# Patient Record
Sex: FEMALE
[De-identification: names, ages, dates, MRNs, and addresses within clinical notes are randomized per-mention and may not be internally consistent; named-entity substitution may affect disease eponyms.]

---

## 2024-05-22 ENCOUNTER — APPOINTMENT (OUTPATIENT)
Dept: UROLOGY | Facility: CLINIC | Age: 64
End: 2024-05-22
Payer: MEDICAID

## 2024-05-22 VITALS
HEART RATE: 65 BPM | DIASTOLIC BLOOD PRESSURE: 77 MMHG | BODY MASS INDEX: 25.69 KG/M2 | WEIGHT: 145 LBS | TEMPERATURE: 97.5 F | SYSTOLIC BLOOD PRESSURE: 130 MMHG | HEIGHT: 63 IN | OXYGEN SATURATION: 99 %

## 2024-05-22 DIAGNOSIS — Z87.898 PERSONAL HISTORY OF OTHER SPECIFIED CONDITIONS: ICD-10-CM

## 2024-05-22 DIAGNOSIS — R10.2 PELVIC AND PERINEAL PAIN: ICD-10-CM

## 2024-05-22 DIAGNOSIS — N81.9 FEMALE GENITAL PROLAPSE, UNSPECIFIED: ICD-10-CM

## 2024-05-22 PROBLEM — Z00.00 ENCOUNTER FOR PREVENTIVE HEALTH EXAMINATION: Status: ACTIVE | Noted: 2024-05-22

## 2024-05-22 PROCEDURE — 99204 OFFICE O/P NEW MOD 45 MIN: CPT

## 2024-05-22 RX ORDER — ESTRADIOL 0.1 MG/G
0.1 CREAM VAGINAL
Qty: 1 | Refills: 0 | Status: ACTIVE | COMMUNITY
Start: 2024-05-22 | End: 1900-01-01

## 2024-05-22 RX ORDER — PANTOPRAZOLE SODIUM 40 MG/1
40 GRANULE, DELAYED RELEASE ORAL
Refills: 0 | Status: ACTIVE | COMMUNITY

## 2024-05-23 ENCOUNTER — TRANSCRIPTION ENCOUNTER (OUTPATIENT)
Age: 64
End: 2024-05-23

## 2024-05-27 NOTE — HISTORY OF PRESENT ILLNESS
[FreeTextEntry1] : 64-year-old  female s/p Hysterectomy and prolapse repair by Dr. Hussein ([facility]) in . She now reports prolapse sensation, incontinence, hesitancy, intermittency and difficulty emptying. She sometimes has to splint to void and states urine sprays in different directions. Incontinence worse with ambulation. She uses 3-5 pads per day which are sometimes fully saturated. She sometimes has blood and yellow discharge from vagina. She also has mild discomfort with intercourse. She was seen by Dr. Hussein's NP 2 months ago for same and was told Dr. Hussein will not be able to do surgery because her insurance is not accepted. She was sent for an MRI which showed a moderate cystocele.   MRI Urogram 24 Impression: Normal CT Urogram. No hydronephrosis, evidence of renal mass or upper collecting system lesion. Moderate Cystocele at rest.   [facility] Operative Report-Dr. Hussein-12 Pre-Op Diagnosis-Uterovaginal prolapse and Stress Urinary Incontinence Operation: Vaginal hysterectomy, Frey culdoplasty, sacrospinous ligament fixation, cystocele repair, placement of transvaginal obturator tape, and cystoscopy.  PVR-261ml  PMH-GERD PSH-Salpingostomy, salpingectomy, , hysterectomy FamHx-Prostate CA(brother), Colon CA(mother) SocHx-denies smoking, drugs or alcohol use Meds/Vitamins-Protonix Allergies-contrast dye(swelling)

## 2024-05-27 NOTE — ASSESSMENT
[FreeTextEntry1] :   Impression/plan: 64-year-old female with pelvic organ prolapse, incomplete emptying. Stage 3 anterior and apical prolapse, stage 1 rectocele.   -Options discussed including pessary and reconstruction. Chances of recurrence and surgical risks reviewed. Patient has chosen to move forward with a pessary. -Estrace for eroded tissue -F/u for pessary fitting after using estrace x 4 weeks  I, Dr. Yeyo Helms, personally performed the evaluation and management (E/M) services for this new patient.  That E/M includes conducting the clinically appropriate initial history &/or exam, assessing all conditions, and establishing the plan of care.  Today, my SUNNY Joanie, was here to observe &/or participate in the visit & follow plan of care established by me.

## 2024-05-27 NOTE — PHYSICAL EXAM
[General Appearance - Well Developed] : well developed [General Appearance - Well Nourished] : well nourished [Normal Appearance] : normal appearance [Well Groomed] : well groomed [General Appearance - In No Acute Distress] : no acute distress [] : no respiratory distress [Exaggerated Use Of Accessory Muscles For Inspiration] : no accessory muscle use [Abdomen Soft] : soft [Abdomen Tenderness] : non-tender [External Female Genitalia] : normal external genitalia [de-identified] : Stage 3 anterior and apical prolapse, stage 1 posterior prolapse, area of vaginal tissue erosion, no mesh palpated on exam

## 2024-06-21 ENCOUNTER — APPOINTMENT (OUTPATIENT)
Dept: UROLOGY | Facility: CLINIC | Age: 64
End: 2024-06-21
Payer: MEDICAID

## 2024-06-21 VITALS
TEMPERATURE: 97.3 F | DIASTOLIC BLOOD PRESSURE: 82 MMHG | OXYGEN SATURATION: 95 % | SYSTOLIC BLOOD PRESSURE: 123 MMHG | HEART RATE: 73 BPM

## 2024-06-21 DIAGNOSIS — N81.10 CYSTOCELE, UNSPECIFIED: ICD-10-CM

## 2024-06-21 PROCEDURE — 99213 OFFICE O/P EST LOW 20 MIN: CPT

## 2024-06-24 PROBLEM — N81.10 VAGINAL PROLAPSE: Status: ACTIVE | Noted: 2024-05-22

## 2024-06-24 NOTE — HISTORY OF PRESENT ILLNESS
[FreeTextEntry1] : 64-year-old  female s/p Hysterectomy and prolapse repair by Dr. Hussein ([facility]) in . She now reports prolapse sensation, incontinence, hesitancy, intermittency and difficulty emptying. She sometimes has to splint to void and states urine sprays in different directions. Incontinence worse with ambulation. She uses 3-5 pads per day which are sometimes fully saturated. She sometimes has blood and yellow discharge from vagina. She also has mild discomfort with intercourse. She was seen by Dr. Hussein's NP 2 months ago for same and was told Dr. Hussein will not be able to do surgery because her insurance is not accepted. She was sent for an MRI which showed a moderate cystocele.  MRI Urogram 24 Impression: Normal CT Urogram. No hydronephrosis, evidence of renal mass or upper collecting system lesion. Moderate Cystocele at rest.  [facility] Operative Report-Dr. Hussein-12 Pre-Op Diagnosis-Uterovaginal prolapse and Stress Urinary Incontinence Operation: Vaginal hysterectomy, Frey culdoplasty, sacrospinous ligament fixation, cystocele repair, placement of transvaginal obturator tape, and cystoscopy.  PVR-261ml  PMH-GERD PSH-Salpingostomy, salpingectomy, , hysterectomy FamHx-Prostate CA(brother), Colon CA(mother) SocHx-denies smoking, drugs or alcohol use Meds/Vitamins-Protonix Allergies-contrast dye(swelling)   24  64-year-old  female s/p vaginal hysterectomy and cystocele repair, now with stage 3 anterior and apical prolapse, stage 1 rectocele. Treated with estrace x 4 weeks due to vaginal tissue erosion. She is here today for pessary fitting. PE: Granulation tissue present to eroded area. No active bleeding or purulent drainage.

## 2024-06-24 NOTE — ASSESSMENT
[FreeTextEntry1] :   Impression/plan: 64-year-old female with pelvic organ prolapse-Stage 3 anterior and apical prolapse, stage 1 rectocele.  -Pessary fitting deferred due to eroded tissue -Patient advised to continue estrace for another 6-8 weeks to promote healing -Patient is interested surgical options. Informed her that an abdominal repair with mesh is recommended to decrease the risk of recurrence. She verbalized understanding.  -F/u in 6-8 weeks for PE. Will further discuss surgery at that time.   I, Dr. Yeyo Helms, personally performed the evaluation and management (E/M) services for this established patient who presents today with (a) new problem(s)/exacerbation of (an) existing condition(s).  That E/M includes conducting the clinically appropriate interval history &/or exam, assessing all new/exacerbated conditions, and establishing a new plan of care.  Today, my SUNNY Joanie, was here to observe &/or participate in the visit & follow plan of care established by me.

## 2024-06-24 NOTE — PHYSICAL EXAM
[General Appearance - Well Developed] : well developed [General Appearance - Well Nourished] : well nourished [Normal Appearance] : normal appearance [Well Groomed] : well groomed [General Appearance - In No Acute Distress] : no acute distress [] : no respiratory distress [Exaggerated Use Of Accessory Muscles For Inspiration] : no accessory muscle use [Normal Station and Gait] : the gait and station were normal for the patient's age [Skin Color & Pigmentation] : normal skin color and pigmentation [No Focal Deficits] : no focal deficits [Oriented To Time, Place, And Person] : oriented to person, place, and time [Affect] : the affect was normal [Mood] : the mood was normal [Not Anxious] : not anxious [de-identified] : Granulation tissue present to eroded area. No active bleeding or purulent drainage.

## 2024-07-03 ENCOUNTER — TRANSCRIPTION ENCOUNTER (OUTPATIENT)
Age: 64
End: 2024-07-03

## 2024-07-18 ENCOUNTER — NON-APPOINTMENT (OUTPATIENT)
Age: 64
End: 2024-07-18

## 2024-07-18 ENCOUNTER — TRANSCRIPTION ENCOUNTER (OUTPATIENT)
Age: 64
End: 2024-07-18

## 2024-07-30 ENCOUNTER — APPOINTMENT (OUTPATIENT)
Dept: UROLOGY | Facility: CLINIC | Age: 64
End: 2024-07-30
Payer: MEDICAID

## 2024-07-30 PROCEDURE — 99214 OFFICE O/P EST MOD 30 MIN: CPT

## 2024-08-04 NOTE — HISTORY OF PRESENT ILLNESS
[FreeTextEntry1] : 64-year-old  female s/p Hysterectomy and prolapse repair by Dr. Hussein ([facility]) in . She now reports prolapse sensation, incontinence, hesitancy, intermittency and difficulty emptying. She sometimes has to splint to void and states urine sprays in different directions. Incontinence worse with ambulation. She uses 3-5 pads per day which are sometimes fully saturated. She sometimes has blood and yellow discharge from vagina. She also has mild discomfort with intercourse. She was seen by Dr. Hussein's NP 2 months ago for same and was told Dr. Hussein will not be able to do surgery because her insurance is not accepted. She was sent for an MRI which showed a moderate cystocele.  MRI Urogram 24 Impression: Normal CT Urogram. No hydronephrosis, evidence of renal mass or upper collecting system lesion. Moderate Cystocele at rest.  [facility] Operative Report-Dr. Hussein-12 Pre-Op Diagnosis-Uterovaginal prolapse and Stress Urinary Incontinence Operation: Vaginal hysterectomy, Frey culdoplasty, sacrospinous ligament fixation, cystocele repair, placement of transvaginal obturator tape, and cystoscopy.  PVR-261ml  PMH-GERD PSH-Salpingostomy, salpingectomy, , hysterectomy FamHx-Prostate CA(brother), Colon CA(mother) SocHx-denies smoking, drugs or alcohol use Meds/Vitamins-Protonix Allergies-contrast dye(swelling)   24  64-year-old  female s/p vaginal hysterectomy and cystocele repair, now with stage 3 anterior and apical prolapse, stage 1 rectocele. Treated with estrace x 4 weeks due to vaginal tissue erosion. She is here today for pessary fitting. PE: Granulation tissue present to eroded area. No active bleeding or purulent drainage.  24  64-year-old  female s/p vaginal hysterectomy and cystocele repair, now with stage 3 anterior and apical prolapse, stage 1 rectocele. Treated with estrace x 4 weeks due to vaginal tissue erosion. On exam today area of erosion improved in quality, but still present, not a pessary candidate.   We reviewed surgical options.  I explained concern about mesh with the vaginal erosion, would recommend a native tissue repair.   Plan:  Options for management of pelvic organ prolapse discussed at length with the patient today. Conservative options including total for physical therapy and pessary were discussed with the patient. Surgical options discussed with the patient including both transabdominal and transvaginal routes with or without mesh augmentation were discussed with the patient. Both laparoscopic robotic and open options discussed with the transabdominal route. FDA advisory for the use of mesh and prolapse surgery were discussed with the patient at length. RBAPC of our procedure was discussed at length appeared. Risks including, but not limited to, injury to the bladder, injury to the urethra, injury to bowel, injury to nerves and vascular structures, bleeding, infections, persistent or recurrent prolapse, pelvic/abdominal/vaginal pain, dyspareunia, fistula formation, ureteral injury, and dysfunctional voiding were discussed with the patient at length. After all the above was discussed and all questions answered she decided to move forward with transvaginal SSF/Anterior-posterior repair.  2. UDS r/o occult JOSE.

## 2024-08-04 NOTE — HISTORY OF PRESENT ILLNESS
[FreeTextEntry1] : 64-year-old  female s/p Hysterectomy and prolapse repair by Dr. Hussein ([facility]) in . She now reports prolapse sensation, incontinence, hesitancy, intermittency and difficulty emptying. She sometimes has to splint to void and states urine sprays in different directions. Incontinence worse with ambulation. She uses 3-5 pads per day which are sometimes fully saturated. She sometimes has blood and yellow discharge from vagina. She also has mild discomfort with intercourse. She was seen by Dr. Hussein's NP 2 months ago for same and was told Dr. Hussein will not be able to do surgery because her insurance is not accepted. She was sent for an MRI which showed a moderate cystocele.  MRI Urogram 24 Impression: Normal CT Urogram. No hydronephrosis, evidence of renal mass or upper collecting system lesion. Moderate Cystocele at rest.  [facility] Operative Report-Dr. Hussein-12 Pre-Op Diagnosis-Uterovaginal prolapse and Stress Urinary Incontinence Operation: Vaginal hysterectomy, Frey culdoplasty, sacrospinous ligament fixation, cystocele repair, placement of transvaginal obturator tape, and cystoscopy.  PVR-261ml  PMH-GERD PSH-Salpingostomy, salpingectomy, , hysterectomy FamHx-Prostate CA(brother), Colon CA(mother) SocHx-denies smoking, drugs or alcohol use Meds/Vitamins-Protonix Allergies-contrast dye(swelling)   24  64-year-old  female s/p vaginal hysterectomy and cystocele repair, now with stage 3 anterior and apical prolapse, stage 1 rectocele. Treated with estrace x 4 weeks due to vaginal tissue erosion. She is here today for pessary fitting. PE: Granulation tissue present to eroded area. No active bleeding or purulent drainage.  24  64-year-old  female s/p vaginal hysterectomy and cystocele repair, now with stage 3 anterior and apical prolapse, stage 1 rectocele. Treated with estrace x 4 weeks due to vaginal tissue erosion. On exam today area of erosion improved in quality, but still present, not a pessary candidate.   We reviewed surgical options.  I explained concern about mesh with the vaginal erosion, would recommend a native tissue repair.   Plan:  Options for management of pelvic organ prolapse discussed at length with the patient today. Conservative options including total for physical therapy and pessary were discussed with the patient. Surgical options discussed with the patient including both transabdominal and transvaginal routes with or without mesh augmentation were discussed with the patient. Both laparoscopic robotic and open options discussed with the transabdominal route. FDA advisory for the use of mesh and prolapse surgery were discussed with the patient at length. RBAPC of our procedure was discussed at length appeared. Risks including, but not limited to, injury to the bladder, injury to the urethra, injury to bowel, injury to nerves and vascular structures, bleeding, infections, persistent or recurrent prolapse, pelvic/abdominal/vaginal pain, dyspareunia, fistula formation, ureteral injury, and dysfunctional voiding were discussed with the patient at length. After all the above was discussed and all questions answered she decided to move forward with transvaginal SSF/Anterior-posterior repair.  2. UDS r/o occult JOSE.

## 2024-08-05 ENCOUNTER — APPOINTMENT (OUTPATIENT)
Dept: UROLOGY | Facility: CLINIC | Age: 64
End: 2024-08-05

## 2024-08-05 PROCEDURE — 51728 CYSTOMETROGRAM W/VP: CPT

## 2024-08-05 PROCEDURE — 99215 OFFICE O/P EST HI 40 MIN: CPT | Mod: 25

## 2024-08-05 PROCEDURE — 51797 INTRAABDOMINAL PRESSURE TEST: CPT

## 2024-08-05 PROCEDURE — 51784 ANAL/URINARY MUSCLE STUDY: CPT

## 2024-08-05 PROCEDURE — 51741 ELECTRO-UROFLOWMETRY FIRST: CPT

## 2024-08-10 NOTE — HISTORY OF PRESENT ILLNESS
[FreeTextEntry1] : 64-year-old  female s/p Hysterectomy and prolapse repair by Dr. Hussein ([facility]) in . She now reports prolapse sensation, incontinence, hesitancy, intermittency and difficulty emptying. She sometimes has to splint to void and states urine sprays in different directions. Incontinence worse with ambulation. She uses 3-5 pads per day which are sometimes fully saturated. She sometimes has blood and yellow discharge from vagina. She also has mild discomfort with intercourse. She was seen by Dr. Hussein's NP 2 months ago for same and was told Dr. Hussein will not be able to do surgery because her insurance is not accepted. She was sent for an MRI which showed a moderate cystocele.  MRI Urogram 24 Impression: Normal CT Urogram. No hydronephrosis, evidence of renal mass or upper collecting system lesion. Moderate Cystocele at rest.  [facility] Operative Report-Dr. Hussein-12 Pre-Op Diagnosis-Uterovaginal prolapse and Stress Urinary Incontinence Operation: Vaginal hysterectomy, Frey culdoplasty, sacrospinous ligament fixation, cystocele repair, placement of transvaginal obturator tape, and cystoscopy.  PVR-261ml  PMH-GERD PSH-Salpingostomy, salpingectomy, , hysterectomy FamHx-Prostate CA(brother), Colon CA(mother) SocHx-denies smoking, drugs or alcohol use Meds/Vitamins-Protonix Allergies-contrast dye(swelling)   24  64-year-old  female s/p vaginal hysterectomy and cystocele repair, now with stage 3 anterior and apical prolapse, stage 1 rectocele. Treated with estrace x 4 weeks due to vaginal tissue erosion. She is here today for pessary fitting. PE: Granulation tissue present to eroded area. No active bleeding or purulent drainage.  24  64-year-old  female s/p vaginal hysterectomy and cystocele repair, now with stage 3 anterior and apical prolapse, stage 1 rectocele. Treated with estrace x 4 weeks due to vaginal tissue erosion. On exam today area of erosion improved in quality, but still present, not a pessary candidate.  We reviewed surgical options.  I explained concern about mesh with the vaginal erosion, would recommend a native tissue repair.  Plan: Options for management of pelvic organ prolapse discussed at length with the patient today. Conservative options including total for physical therapy and pessary were discussed with the patient. Surgical options discussed with the patient including both transabdominal and transvaginal routes with or without mesh augmentation were discussed with the patient. Both laparoscopic robotic and open options discussed with the transabdominal route. FDA advisory for the use of mesh and prolapse surgery were discussed with the patient at length. RBAPC of our procedure was discussed at length appeared. Risks including, but not limited to, injury to the bladder, injury to the urethra, injury to bowel, injury to nerves and vascular structures, bleeding, infections, persistent or recurrent prolapse, pelvic/abdominal/vaginal pain, dyspareunia, fistula formation, ureteral injury, and dysfunctional voiding were discussed with the patient at length. After all the above was discussed and all questions answered she decided to move forward with transvaginal SSF/Anterior-posterior repair. 2. UDS r/o occult JOSE.  24  64-year-old  female s/p vaginal hysterectomy and cystocele repair, now with stage 3 anterior and apical prolapse, stage 1 rectocele. Treated with estrace x 4 weeks due to vaginal tissue erosion. On exam today area of erosion improved in quality, but still present, not a pessary candidate. The patient is here today to have urodynamics performed, review results and discuss treatment options. Denies significant change in med/surg history since last office visit.   UDS -  Filling/Storage Phase: First sensation 20 mL, First desire 238 mL, Normal desire 488 mL and Cystometric capacity 544 mL. Involuntary contractions were not present . Pt did not demonstrate stress urinary incontinence. Pt did not demonstrate urge urinary incontinence. Compliance: normal. EMG Activity: normal.    Voiding Phase: Qmax 6.6 mL/s , Pdet at Qmax 38 cm/H20, Qmax at Pdet 1.4 mL/s, Pavg 20.2 cm/H20, Qavg 3.3 mL/s, voiding time 1:31 mm:sec, voided volume 154 mL and post void residual 390 mL. EMG activity was synergic.   Urodynamic Interpretation :   Normal bladder sensation. Normal bladder capacity. Patient experiencing no detrusor instability. The uroflow rate is decreased. The uroflow pattern is staccato. The detrusor contractility is normal. The intravesical voiding pressure is normal. The patient has incomplete bladder emptying, a post void residual of 390 cc. The spincter activity  is normal synergic.   Additional Procedure Related Findings/Comments: Catheterized patient post-UDS.  Plan: UDS reviewed with the patoent - no occult JOSE with pessary in place.  1. Options for management of pelvic organ prolapse discussed at length with the patient today. Conservative options including total for physical therapy and pessary were discussed with the patient. Surgical options discussed with the patient including both transabdominal and transvaginal routes with or without mesh augmentation were discussed with the patient. Both laparoscopic robotic and open options discussed with the transabdominal route. FDA advisory for the use of mesh and prolapse surgery were discussed with the patient at length. RBAPC of our procedure was discussed at length appeared. Risks including, but not limited to, injury to the bladder, injury to the urethra, injury to bowel, injury to nerves and vascular structures, bleeding, infections, persistent or recurrent prolapse, pelvic/abdominal/vaginal pain, dyspareunia, fistula formation, ureteral injury, and dysfunctional voiding were discussed with the patient at length. After all the above was discussed and all questions answered she decided to move forward with transvaginal sacrospinous fixation/a/p repair given risk of mesh with erosion in place. Sling not indicated given no evidence of occult incontinence.

## 2024-08-10 NOTE — HISTORY OF PRESENT ILLNESS
[FreeTextEntry1] : 64-year-old  female s/p Hysterectomy and prolapse repair by Dr. Hussein ([facility]) in . She now reports prolapse sensation, incontinence, hesitancy, intermittency and difficulty emptying. She sometimes has to splint to void and states urine sprays in different directions. Incontinence worse with ambulation. She uses 3-5 pads per day which are sometimes fully saturated. She sometimes has blood and yellow discharge from vagina. She also has mild discomfort with intercourse. She was seen by Dr. Hussein's NP 2 months ago for same and was told Dr. Hussein will not be able to do surgery because her insurance is not accepted. She was sent for an MRI which showed a moderate cystocele.  MRI Urogram 24 Impression: Normal CT Urogram. No hydronephrosis, evidence of renal mass or upper collecting system lesion. Moderate Cystocele at rest.  [facility] Operative Report-Dr. Hussein-12 Pre-Op Diagnosis-Uterovaginal prolapse and Stress Urinary Incontinence Operation: Vaginal hysterectomy, Frey culdoplasty, sacrospinous ligament fixation, cystocele repair, placement of transvaginal obturator tape, and cystoscopy.  PVR-261ml  PMH-GERD PSH-Salpingostomy, salpingectomy, , hysterectomy FamHx-Prostate CA(brother), Colon CA(mother) SocHx-denies smoking, drugs or alcohol use Meds/Vitamins-Protonix Allergies-contrast dye(swelling)   24  64-year-old  female s/p vaginal hysterectomy and cystocele repair, now with stage 3 anterior and apical prolapse, stage 1 rectocele. Treated with estrace x 4 weeks due to vaginal tissue erosion. She is here today for pessary fitting. PE: Granulation tissue present to eroded area. No active bleeding or purulent drainage.  24  64-year-old  female s/p vaginal hysterectomy and cystocele repair, now with stage 3 anterior and apical prolapse, stage 1 rectocele. Treated with estrace x 4 weeks due to vaginal tissue erosion. On exam today area of erosion improved in quality, but still present, not a pessary candidate.  We reviewed surgical options.  I explained concern about mesh with the vaginal erosion, would recommend a native tissue repair.  Plan: Options for management of pelvic organ prolapse discussed at length with the patient today. Conservative options including total for physical therapy and pessary were discussed with the patient. Surgical options discussed with the patient including both transabdominal and transvaginal routes with or without mesh augmentation were discussed with the patient. Both laparoscopic robotic and open options discussed with the transabdominal route. FDA advisory for the use of mesh and prolapse surgery were discussed with the patient at length. RBAPC of our procedure was discussed at length appeared. Risks including, but not limited to, injury to the bladder, injury to the urethra, injury to bowel, injury to nerves and vascular structures, bleeding, infections, persistent or recurrent prolapse, pelvic/abdominal/vaginal pain, dyspareunia, fistula formation, ureteral injury, and dysfunctional voiding were discussed with the patient at length. After all the above was discussed and all questions answered she decided to move forward with transvaginal SSF/Anterior-posterior repair. 2. UDS r/o occult JOSE.  24  64-year-old  female s/p vaginal hysterectomy and cystocele repair, now with stage 3 anterior and apical prolapse, stage 1 rectocele. Treated with estrace x 4 weeks due to vaginal tissue erosion. On exam today area of erosion improved in quality, but still present, not a pessary candidate. The patient is here today to have urodynamics performed, review results and discuss treatment options. Denies significant change in med/surg history since last office visit.   UDS -  Filling/Storage Phase: First sensation 20 mL, First desire 238 mL, Normal desire 488 mL and Cystometric capacity 544 mL. Involuntary contractions were not present . Pt did not demonstrate stress urinary incontinence. Pt did not demonstrate urge urinary incontinence. Compliance: normal. EMG Activity: normal.    Voiding Phase: Qmax 6.6 mL/s , Pdet at Qmax 38 cm/H20, Qmax at Pdet 1.4 mL/s, Pavg 20.2 cm/H20, Qavg 3.3 mL/s, voiding time 1:31 mm:sec, voided volume 154 mL and post void residual 390 mL. EMG activity was synergic.   Urodynamic Interpretation :   Normal bladder sensation. Normal bladder capacity. Patient experiencing no detrusor instability. The uroflow rate is decreased. The uroflow pattern is staccato. The detrusor contractility is normal. The intravesical voiding pressure is normal. The patient has incomplete bladder emptying, a post void residual of 390 cc. The spincter activity  is normal synergic.   Additional Procedure Related Findings/Comments: Catheterized patient post-UDS.  Plan: UDS reviewed with the patoent - no occult JOSE with pessary in place.  1. Options for management of pelvic organ prolapse discussed at length with the patient today. Conservative options including total for physical therapy and pessary were discussed with the patient. Surgical options discussed with the patient including both transabdominal and transvaginal routes with or without mesh augmentation were discussed with the patient. Both laparoscopic robotic and open options discussed with the transabdominal route. FDA advisory for the use of mesh and prolapse surgery were discussed with the patient at length. RBAPC of our procedure was discussed at length appeared. Risks including, but not limited to, injury to the bladder, injury to the urethra, injury to bowel, injury to nerves and vascular structures, bleeding, infections, persistent or recurrent prolapse, pelvic/abdominal/vaginal pain, dyspareunia, fistula formation, ureteral injury, and dysfunctional voiding were discussed with the patient at length. After all the above was discussed and all questions answered she decided to move forward with transvaginal sacrospinous fixation/a/p repair given risk of mesh with erosion in place. Sling not indicated given no evidence of occult incontinence.

## 2024-09-20 NOTE — ASU PATIENT PROFILE, ADULT - NSICDXPASTSURGICALHX_GEN_ALL_CORE_FT
PAST SURGICAL HISTORY:  No significant past surgical history      PAST SURGICAL HISTORY:  H/O: hysterectomy     History of bladder surgery lift    History of  section x1    History of ectopic pregnancy

## 2024-09-20 NOTE — ASU PATIENT PROFILE, ADULT - FALL HARM RISK - RISK INTERVENTIONS

## 2024-09-20 NOTE — ASU PATIENT PROFILE, ADULT - NS PREOP UNDERSTANDS INFO
Spoke to patient to be NPO/No solid  foods  after 2200 pm , allow to drink water or apple juice till  12-2 am, dres socmofrtale, no lotions, no jewelry, no nail polish, bring  Id photo  and insurance cards , escort arranged,  address and telephone given/yes

## 2024-09-20 NOTE — ASU PATIENT PROFILE, ADULT - NSICDXPASTMEDICALHX_GEN_ALL_CORE_FT
PAST MEDICAL HISTORY:  Prolapse of female bladder, acquired      PAST MEDICAL HISTORY:  GERD (gastroesophageal reflux disease)     History of UTI at present currently taking amoxicillin Rx given by PCP did not take today    Prolapse of female bladder, acquired

## 2024-09-20 NOTE — ASU PATIENT PROFILE, ADULT - FALL HARM RISK - FALLEN IN PAST
No stepped into pot hole and twisted left ankle never Went for any medical follow up  Per pt Ok After one week/Accidental fall

## 2024-09-22 ENCOUNTER — TRANSCRIPTION ENCOUNTER (OUTPATIENT)
Age: 64
End: 2024-09-22

## 2024-09-23 ENCOUNTER — OUTPATIENT (OUTPATIENT)
Dept: OUTPATIENT SERVICES | Facility: HOSPITAL | Age: 64
LOS: 1 days | Discharge: HOME | End: 2024-09-23
Payer: MEDICAID

## 2024-09-23 ENCOUNTER — TRANSCRIPTION ENCOUNTER (OUTPATIENT)
Age: 64
End: 2024-09-23

## 2024-09-23 ENCOUNTER — APPOINTMENT (OUTPATIENT)
Dept: UROLOGY | Facility: AMBULATORY SURGERY CENTER | Age: 64
End: 2024-09-23

## 2024-09-23 VITALS
DIASTOLIC BLOOD PRESSURE: 65 MMHG | HEART RATE: 69 BPM | SYSTOLIC BLOOD PRESSURE: 115 MMHG | RESPIRATION RATE: 18 BRPM | OXYGEN SATURATION: 96 %

## 2024-09-23 VITALS
TEMPERATURE: 98 F | OXYGEN SATURATION: 97 % | SYSTOLIC BLOOD PRESSURE: 122 MMHG | HEART RATE: 58 BPM | WEIGHT: 194.23 LBS | DIASTOLIC BLOOD PRESSURE: 81 MMHG | HEIGHT: 64 IN | RESPIRATION RATE: 16 BRPM

## 2024-09-23 DIAGNOSIS — Z98.890 OTHER SPECIFIED POSTPROCEDURAL STATES: Chronic | ICD-10-CM

## 2024-09-23 DIAGNOSIS — Z87.59 PERSONAL HISTORY OF OTHER COMPLICATIONS OF PREGNANCY, CHILDBIRTH AND THE PUERPERIUM: Chronic | ICD-10-CM

## 2024-09-23 DIAGNOSIS — Z98.891 HISTORY OF UTERINE SCAR FROM PREVIOUS SURGERY: Chronic | ICD-10-CM

## 2024-09-23 DIAGNOSIS — Z90.710 ACQUIRED ABSENCE OF BOTH CERVIX AND UTERUS: Chronic | ICD-10-CM

## 2024-09-23 PROCEDURE — 57240 ANTERIOR COLPORRHAPHY: CPT

## 2024-09-23 PROCEDURE — 57283 COLPOPEXY INTRAPERITONEAL: CPT

## 2024-09-23 DEVICE — SLING FEMALE DESARA: Type: IMPLANTABLE DEVICE | Status: FUNCTIONAL

## 2024-09-23 RX ORDER — OXYCODONE HYDROCHLORIDE 5 MG/1
5 TABLET ORAL ONCE
Refills: 0 | Status: DISCONTINUED | OUTPATIENT
Start: 2024-09-23 | End: 2024-09-23

## 2024-09-23 RX ORDER — PANTOPRAZOLE SODIUM 40 MG
1 TABLET, DELAYED RELEASE (ENTERIC COATED) ORAL
Refills: 0 | DISCHARGE

## 2024-09-23 RX ORDER — DOCUSATE CALCIUM 240 MG/1
1 CAPSULE ORAL
Qty: 20 | Refills: 0
Start: 2024-09-23 | End: 2024-10-02

## 2024-09-23 RX ORDER — ACETAMINOPHEN 325 MG/1
1000 TABLET ORAL ONCE
Refills: 0 | Status: COMPLETED | OUTPATIENT
Start: 2024-09-23 | End: 2024-09-23

## 2024-09-23 RX ORDER — ACETAMINOPHEN AND CODEINE PHOSPHATE 300; 60 MG/1; MG/1
1 TABLET ORAL
Qty: 5 | Refills: 0
Start: 2024-09-23

## 2024-09-23 RX ORDER — APREPITANT 40 MG/1
40 CAPSULE ORAL ONCE
Refills: 0 | Status: COMPLETED | OUTPATIENT
Start: 2024-09-23 | End: 2024-09-23

## 2024-09-23 RX ORDER — ACETAMINOPHEN 325 MG/1
1000 TABLET ORAL ONCE
Refills: 0 | Status: DISCONTINUED | OUTPATIENT
Start: 2024-09-23 | End: 2024-09-23

## 2024-09-23 RX ORDER — AMOXICILLIN 500 MG
1 CAPSULE ORAL
Refills: 0 | DISCHARGE

## 2024-09-23 RX ADMIN — OXYCODONE HYDROCHLORIDE 5 MILLIGRAM(S): 5 TABLET ORAL at 13:16

## 2024-09-23 RX ADMIN — APREPITANT 40 MILLIGRAM(S): 40 CAPSULE ORAL at 08:42

## 2024-09-23 RX ADMIN — ACETAMINOPHEN 1000 MILLIGRAM(S): 325 TABLET ORAL at 08:42

## 2024-09-23 NOTE — ASU DISCHARGE PLAN (ADULT/PEDIATRIC) - NS MD DC FALL RISK RISK
For information on Fall & Injury Prevention, visit: [email or web address] OR  [email or web address] OR  https://www.cdc.gov/steadi/patient.html

## 2024-09-23 NOTE — ASU PREOP CHECKLIST - 1.
Per pt presently taking Amoxicillin 500mg PO BID, given to her by PCP for current UTI (Pt did not take today )  UTI
10

## 2024-09-23 NOTE — ASU DISCHARGE PLAN (ADULT/PEDIATRIC) - ASU DC SPECIAL INSTRUCTIONSFT
TRANSVAGINAL SURGERY    SURGICAL WOUND: There are often lumps and bumps that can be felt in the vagina on either or both sides up to two (2) months or more post operatively. These are of no concern and with time they will soften and disappear.  Any “black and blue” bruising areas will also resolve. There may also be some vaginal bleeding during this time. A liner should be used for the next few weeks, unless bathing, to capture any bleeding. You may apply an ice-pack for 15 minutes out of every hour for the first 24 -36 hours to minimize pain and swelling.    STITCHES: The stitches in the incision will dissolve and fall out by themselves. Sometimes skin stitches may open, allowing a slight gaping of the incision. This is no problem if you keep the area clean.    CATHETER: Some patients are sent home with a Matthews catheter, while others go home urinating on their own. A Matthews catheter continuously drains the urine from the bladder. If you still have a catheter, the nurses will review instructions and care before you go home.    PAIN: You may have some intermittent pain for up to six (6) weeks post operatively. Pain does not signify any problem unless associated with fever, chills, or inability to void.  If you experience any fevers or chills please call immediately as this may be signs of an infection. You may take Tylenol (acetaminophen) 650-975mg and/or Motrin (ibuprofen) 400-600mg, both available over the counter, for pain every 6 hours as needed. Do not exceed 4000mg of Tylenol (acetaminophen) daily. You may alternate these medications such that you take one or the other every 3 hours for around the clock pain coverage. For severe pain, take Percocet 5/325mg every 6 hours.     ANTIBIOTICS: You may be given a prescription for an antibiotic, please take this medication as instructed and be sure to complete the entire course.     STOOL SOFTENERS: Do not allow yourself to become constipated as straining may cause bleeding. Take stool softeners or a laxative (ex. Miralax, Colace, Senokot, ExLax, etc), available over the counter, if taking Percocet.     ANTICOAGULATION: If you are taking any blood thinning medications, please discuss with your urologist prior to restarting these medications unless otherwise specified.    BATHING: You may sponge bath 24 hours after surgery, but minimize water to the surgical incision.    DIET: You may resume your regular diet and regular medication regimen.    ACTIVITY: No heavy lifting or strenuous exercise until you are evaluated at your post-operative appointment. Otherwise, you may return to your usual level of physical activity.    FOLLOW-UP: If you did not already schedule your post-operative appointment, please call your urologist to schedule and follow-up appointment.    CALL YOUR UROLOGIST IF: You have any bleeding that does not stop, inability to void >8 hours, fever over 100.4 F, chills, persistent nausea/vomiting, changes in your incision concerning for infection, or if your pain is not controlled on your discharge pain medications.

## 2024-09-23 NOTE — BRIEF OPERATIVE NOTE - NSICDXBRIEFPOSTOP_GEN_ALL_CORE_FT
POST-OP DIAGNOSIS:  Uterine prolapse 23-Sep-2024 12:44:14  Pepito Mendez  Prolapse of anterior vaginal wall 23-Sep-2024 12:44:06  Pepito Mendez

## 2024-09-23 NOTE — ASU PREOP CHECKLIST - 2.
PT admits fell approximately 2 weeks ago never sought medical attention stated  felt fine after one week

## 2024-09-23 NOTE — BRIEF OPERATIVE NOTE - NSICDXBRIEFPREOP_GEN_ALL_CORE_FT
PRE-OP DIAGNOSIS:  Prolapse of anterior vaginal wall 23-Sep-2024 12:43:16  Pepito Mendez  Uterine prolapse 23-Sep-2024 12:43:53  Pepito Mendez

## 2024-09-23 NOTE — BRIEF OPERATIVE NOTE - NSICDXBRIEFPROCEDURE_GEN_ALL_CORE_FT
PROCEDURES:  Cystocele repair 23-Sep-2024 12:42:32  Pepito Mendez  Sacrospinous ligament fixation for vaginal prolapse 23-Sep-2024 12:42:44  Pepito Mendez  Diagnostic cystoscopy 23-Sep-2024 12:43:06  Pepito Mendez

## 2024-09-27 PROBLEM — Z87.440 PERSONAL HISTORY OF URINARY (TRACT) INFECTIONS: Chronic | Status: ACTIVE | Noted: 2024-09-23

## 2024-09-27 PROBLEM — N81.10 CYSTOCELE, UNSPECIFIED: Chronic | Status: ACTIVE | Noted: 2024-09-20

## 2024-09-27 PROBLEM — K21.9 GASTRO-ESOPHAGEAL REFLUX DISEASE WITHOUT ESOPHAGITIS: Chronic | Status: ACTIVE | Noted: 2024-09-23

## 2024-10-17 ENCOUNTER — NON-APPOINTMENT (OUTPATIENT)
Age: 64
End: 2024-10-17

## 2024-10-29 ENCOUNTER — APPOINTMENT (OUTPATIENT)
Dept: UROLOGY | Facility: CLINIC | Age: 64
End: 2024-10-29
Payer: MEDICAID

## 2024-10-29 DIAGNOSIS — N81.10 CYSTOCELE, UNSPECIFIED: ICD-10-CM

## 2024-10-29 PROCEDURE — 99024 POSTOP FOLLOW-UP VISIT: CPT

## (undated) DEVICE — VAGINAL PACKING 2"

## (undated) DEVICE — SUT VICRYL 2-0 27" CT-2 UNDYED

## (undated) DEVICE — DRAPE LAVH 124" X 30" X125"

## (undated) DEVICE — DRAPE INSTRUMENT POUCH 6.75" X 11"

## (undated) DEVICE — SUT CAPIO SLIM CAPTURING DEVICE

## (undated) DEVICE — SYR LUER LOK 20CC

## (undated) DEVICE — LONE STAR ELASTIC STAY HOOK 5MM SHARP

## (undated) DEVICE — SUT VICRYL 0 27" SH UNDYED

## (undated) DEVICE — FOLEY TRAY 16FR 5CC LF UMETER CLOSED

## (undated) DEVICE — PACK GENERAL MINOR

## (undated) DEVICE — WARMING BLANKET UPPER ADULT

## (undated) DEVICE — SUT VICRYL PLUS 0 27" CT-2 UNDYED

## (undated) DEVICE — SUT VICRYL 0 27" CT-2 UNDYED

## (undated) DEVICE — LONE STAR RETRACTOR RING 32.5CM X 18.3CM DISP

## (undated) DEVICE — TUBING TUR 2 PRONG

## (undated) DEVICE — BLADE SURGICAL #15 CARBON

## (undated) DEVICE — GLV 7.5 PROTEXIS (WHITE)

## (undated) DEVICE — GLV 8 PROTEXIS (WHITE)

## (undated) DEVICE — ELCTR PENCIL SMOKE EVACUATOR COATED PUSH BUTTON 70MM

## (undated) DEVICE — NDL HYPO REGULAR BEVEL 25G X 1.5" (BLUE)

## (undated) DEVICE — VENODYNE/SCD SLEEVE CALF MEDIUM

## (undated) DEVICE — SUT NDL MARTIN UTERINE 1/2 CIRCLE REVERSE CUTTING 0.050" X 1.677"

## (undated) DEVICE — TUBING SUCTION NONCONDUCTIVE 6MM X 12FT

## (undated) DEVICE — SUT VICRYL PLUS 2-0 27" CT-2 UNDYED

## (undated) DEVICE — POLISHER OR CAUTERY TIP